# Patient Record
Sex: MALE | Race: WHITE | NOT HISPANIC OR LATINO | Employment: FULL TIME | URBAN - METROPOLITAN AREA
[De-identification: names, ages, dates, MRNs, and addresses within clinical notes are randomized per-mention and may not be internally consistent; named-entity substitution may affect disease eponyms.]

---

## 2022-12-12 ENCOUNTER — TELEPHONE (OUTPATIENT)
Dept: OPHTHALMOLOGY | Facility: CLINIC | Age: 22
End: 2022-12-12

## 2022-12-12 ENCOUNTER — HOSPITAL ENCOUNTER (EMERGENCY)
Facility: CLINIC | Age: 22
Discharge: HOME | End: 2022-12-12
Attending: EMERGENCY MEDICINE | Admitting: EMERGENCY MEDICINE
Payer: COMMERCIAL

## 2022-12-12 ENCOUNTER — OFFICE VISIT (OUTPATIENT)
Dept: OPHTHALMOLOGY | Facility: CLINIC | Age: 22
End: 2022-12-12
Attending: STUDENT IN AN ORGANIZED HEALTH CARE EDUCATION/TRAINING PROGRAM
Payer: COMMERCIAL

## 2022-12-12 VITALS
TEMPERATURE: 97.3 F | WEIGHT: 190 LBS | DIASTOLIC BLOOD PRESSURE: 63 MMHG | SYSTOLIC BLOOD PRESSURE: 122 MMHG | RESPIRATION RATE: 18 BRPM | HEART RATE: 69 BPM | OXYGEN SATURATION: 98 %

## 2022-12-12 DIAGNOSIS — H16.133 WELDERS' FLASH, BILATERAL: ICD-10-CM

## 2022-12-12 DIAGNOSIS — H16.133 UV KERATITIS, BILATERAL: Primary | ICD-10-CM

## 2022-12-12 PROCEDURE — 92004 COMPRE OPH EXAM NEW PT 1/>: CPT | Performed by: STUDENT IN AN ORGANIZED HEALTH CARE EDUCATION/TRAINING PROGRAM

## 2022-12-12 PROCEDURE — G0463 HOSPITAL OUTPT CLINIC VISIT: HCPCS

## 2022-12-12 PROCEDURE — 250N000009 HC RX 250: Performed by: EMERGENCY MEDICINE

## 2022-12-12 PROCEDURE — 99283 EMERGENCY DEPT VISIT LOW MDM: CPT

## 2022-12-12 RX ORDER — TETRACAINE HYDROCHLORIDE 5 MG/ML
SOLUTION OPHTHALMIC
Status: DISCONTINUED
Start: 2022-12-12 | End: 2022-12-12 | Stop reason: HOSPADM

## 2022-12-12 RX ORDER — ERYTHROMYCIN 5 MG/G
0.5 OINTMENT OPHTHALMIC 4 TIMES DAILY
Qty: 7 G | Refills: 0 | Status: SHIPPED | OUTPATIENT
Start: 2022-12-12 | End: 2022-12-19

## 2022-12-12 RX ORDER — TETRACAINE HYDROCHLORIDE 5 MG/ML
2 SOLUTION OPHTHALMIC ONCE
Status: COMPLETED | OUTPATIENT
Start: 2022-12-12 | End: 2022-12-12

## 2022-12-12 RX ADMIN — TETRACAINE HYDROCHLORIDE 2 DROP: 5 SOLUTION OPHTHALMIC at 01:12

## 2022-12-12 ASSESSMENT — VISUAL ACUITY
OD_SC: 20/50
OS_SC: 20/70
METHOD: SNELLEN - LINEAR
OS_PH_SC: 20/50
OD_PH_SC: 20/40

## 2022-12-12 ASSESSMENT — CONF VISUAL FIELD
OS_SUPERIOR_TEMPORAL_RESTRICTION: 0
OD_INFERIOR_TEMPORAL_RESTRICTION: 0
OS_INFERIOR_NASAL_RESTRICTION: 0
METHOD: COUNTING FINGERS
OS_INFERIOR_TEMPORAL_RESTRICTION: 0
OD_SUPERIOR_NASAL_RESTRICTION: 0
OS_NORMAL: 1
OD_NORMAL: 1
OD_INFERIOR_NASAL_RESTRICTION: 0
OS_SUPERIOR_NASAL_RESTRICTION: 0
OD_SUPERIOR_TEMPORAL_RESTRICTION: 0

## 2022-12-12 ASSESSMENT — TONOMETRY
OD_IOP_MMHG: 13
IOP_METHOD: TONOPEN
OS_IOP_MMHG: 14

## 2022-12-12 ASSESSMENT — EXTERNAL EXAM - LEFT EYE: OS_EXAM: WNL

## 2022-12-12 ASSESSMENT — ACTIVITIES OF DAILY LIVING (ADL): ADLS_ACUITY_SCORE: 35

## 2022-12-12 ASSESSMENT — EXTERNAL EXAM - RIGHT EYE: OD_EXAM: WNL

## 2022-12-12 ASSESSMENT — CUP TO DISC RATIO
OS_RATIO: 0.6
OD_RATIO: 0.55

## 2022-12-12 ASSESSMENT — ENCOUNTER SYMPTOMS: EYE PAIN: 1

## 2022-12-12 NOTE — ED PROVIDER NOTES
History   Chief Complaint:  Eye Pain      HPI   Rishabh Corrales is a 22 year old male who presents for evaluation of eye pain. On 12/11/22 around 1200 the patient was at work using a  very near to a welding torch while not wearing a welding mcintosh. During this time he was wearing a face shield and did not feel any injury to his eyes. Around 1600 he started to develop burning bilateral eye pain and blurred vision. He awoke from sleep shortly prior to arrival with significantly increased pain and worsened vision, prompting him to come into the ED for evaluation. He has not had any known injury to the eyes. He does not use contact lenses.     Review of Systems   Eyes: Positive for pain (bilateral) and visual disturbance (bilateral eyes).   All other systems reviewed and are negative.      Allergies:  No known drug allergies      Medications:  The patient is not currently taking any prescribed medications.      Past Medical History:     The patient denies any past medical history.     Social History:  The patient presents to the ED accompanied by his cousin.     Physical Exam     Patient Vitals for the past 24 hrs:   BP Temp Temp src Pulse Resp SpO2 Weight   12/12/22 0305 122/63 -- -- 69 -- 98 % --   12/12/22 0057 (!) 165/92 97.3  F (36.3  C) Temporal 83 18 100 % 86.2 kg (190 lb)       Physical Exam  Constitutional:  Oriented to person, place, and time. Minor distress due to pain.   HENT:   Head:    Normocephalic.   Mouth/Throat:   Oropharynx is clear and moist.   Eyes:    EOM are normal. Pupils are equal, round, and reactive to light. Eyes are significantly injected. No mattering or purulence. Vision can see two fingers, unable to visualize visual chart. No Fluorescein uptake. No foreign body. No dendritic lesion or abrasion. Intraocular pressure 9 on the right and 14 on the left. pH 7 bilaterally.   Neck:    Neck supple.   Musculoskeletal:  Normal range of motion.   Neurological:   Alert and oriented to  person, place, and time.           Moves all 4 extremities spontaneously    Skin:    No rash noted. No pallor.      Emergency Department Course     Emergency Department Course:     Reviewed:  I reviewed nursing notes, vitals and past medical history    Assessments:  0218:  I obtained history and examined the patient as noted above.     0300: I updated and reassessed the patient.     Consults:  0254: I spoke with Dr. Nowak of the ophthalmology service regarding patient's presentation, findings, and plan of care.     Interventions:  Fluorescein ophthalmic  0112 Tetracaine 2 drops ophthalmic    Disposition:  The patient was discharged to home.     Impression & Plan         Medical Decision Makin-year-old male that came in with decreased vision and eye pain after being near welding.  Differential includes foreign body, chemical burn, Welders flash/UV keratitis, or other causes.  Exam here does not show foreign body or fluorescein uptake.  He is in quite a bit of pain with significant eye injection bilaterally likely pointing towards UV keratitis.  I did discuss the case with ophthalmology but is willing to see him at 8 AM in the morning in clinic.  I do believe is otherwise appropriate to wait till that time.  He is told to return for worsening pain any new symptoms or concerns.     Diagnosis:    ICD-10-CM    1. Welders' flash, bilateral  H16.133            Scribe Disclosure:  I, Victorino Mansfield, am serving as a scribe at 1:36 AM on 2022 to document services personally performed by Trip Reyes MD based on my observations and the provider's statements to me.           Trip Reyes MD  [phone]

## 2022-12-12 NOTE — PROGRESS NOTES
HPI     Eye Pain Both Eyes      In both eyes.  Pain was noted as 4/10.  Occurring constantly.  It is worse throughout the day.  Since onset it is gradually improving (Symptoms slightly improving since he was at the ER.  ).  Treatments tried include eye drops (Had tetracaine in the ED which helped pain some ).  Response to treatment was mild improvement.           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  His eyes began hurting after he left work and was back at his hotel.  He took a nap and when he woke up he said he couldn't see anything at all.  He doesn't recall any objects getting into his eyes during the welding, but he did get dirt in both eyes earlier in the day from his hard hat, which he rubbed out right away and his eyes felt fine.     He was not doing the welding but was standing right behind the  - he states he was wearing a face shield but it doesn't have any UV protection.      TRISHA Herring 8:43 AM 12/12/2022              Last edited by Any Mcfadden on 12/12/2022  8:56 AM.          Review of systems for the eyes was negative other than the pertinent positives/negatives listed in the HPI.    Ocular Meds: none    Ocular Hx: none    FOHx: no family history of glaucoma or blindness    PMHx: No Diabetes/Autoimmune disease/Cancer/HTN/HUE    Assessment & Plan      Rishabh Corrales is a 22 year old male with the following diagnoses:    1. 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, noted significant eye discomfort in both eyes; eye exam grossly unremarkable except for ocular surface staining; no foreign body noted on lid eversion and fornices swept; no signs of infection    Patient denies contact lens use; Given history and exam findings, this appears to be consistent with UV keratitis OU; patient advised to start preservative free artificial tears QID and prn OU; Patient also to start erythromycin  ophthalmic ointment QID OU for 1 week and recommended to follow up with an ophthalmologist at this time; states he will follow up closer to his home as he lives several hours away; work excuse note provided; patient to wear protective eye wear    Also noted to have mildly increased c/d ratio; optic nerves appear healthy, IOP wnl, CVF full to CF; no family history of glaucoma; patient plans to follow up at a later date with a local eye care provider for baseline visual field testing and OCT RNFL; suspect physiologic cupping;    Counseled return precations    Patient disposition:   Return in about 1 week (around 12/19/2022) for Follow Up, VT, or sooner changes.      Attending Physician Attestation:  Complete documentation of historical and exam elements from today's encounter can be found in the full encounter summary report (not reduplicated in this progress note).  I personally obtained the chief complaint(s) and history of present illness.  I confirmed and edited as necessary the review of systems, past medical/surgical history, family history, social history, and examination findings as documented by others; and I examined the patient myself.  I personally reviewed the relevant tests, images, and reports as documented above.  I formulated and edited as necessary the assessment and plan and discussed the findings and management plan with the patient and family. . - Goldie Dugan MD

## 2022-12-12 NOTE — DISCHARGE INSTRUCTIONS
Please go to the Opthalmology Clinic at 8am 9th floor    [facility]. [address] [place] [id number]  9Th floor at 8am

## 2022-12-12 NOTE — TELEPHONE ENCOUNTER
Telephone Note    I was contacted by Dr. Reyes from [facility] regarding this patient. The following information was obtained via phone call with this provider.      Patient is a 22-year-old male who presents with bilateral eye pain after grinding medal. He had eyewear in place but not UV protective shield. He had burning and blurred vision which started a few hours after. Pain improves with tetracaine. Bilateral injection. No fluorescein uptake. No foreign body.     I conveyed to the consulting physician that I could provide some general thoughts regarding this patient but that a formal consult and evaluation would be necessary for me to provide an active role in the patient's care. Given the reported examination findings, I emphasized the importance of ophthalmology evaluation and I offered to see the patient in the ED today. As alternatives, I also offered the patient follow up with the eye clinic early next week, based on provider comfort. I conveyed to the provider that if there was any concern about the patient's care or my suggestions, the patient should be sent to the ED for evaluation right away.      Following communication with the patient, the provider and the patient selected to be seen in clinic.     Patient will come to PWB at 0730    Ellen Nowak MD  Ophthalmology, PGY-2

## 2022-12-12 NOTE — LETTER
12/12/2022       RE: Rishabh Corrales  [address]  [facility] [address]     Dear Colleague,    Thank you for referring your patient, Rishabh Corrales, to the [facility] [place] at [facility]. Please see a copy of my visit note below.    HPI     Eye Pain Both Eyes      In both eyes.  Pain was noted as 4/10.  Occurring constantly.  It is worse throughout the day.  Since onset it is gradually improving (Symptoms slightly improving since he was at the ER.  ).  Treatments tried include eye drops (Had tetracaine in the ED which helped pain some ).  Response to treatment was mild improvement.           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  His eyes began hurting after he left work and was back at his hotel.  He took a nap and when he woke up he said he couldn't see anything at all.  He doesn't recall any objects getting into his eyes during the welding, but he did get dirt in both eyes earlier in the day from his hard hat, which he rubbed out right away and his eyes felt fine.     He was not doing the welding but was standing right behind the  - he states he was wearing a face shield but it doesn't have any UV protection.      Any Mcfadden, TRISHA 8:43 AM 12/12/2022              Last edited by Any Mcfadden on 12/12/2022  8:56 AM.          Review of systems for the eyes was negative other than the pertinent positives/negatives listed in the HPI.    Ocular Meds: none    Ocular Hx: none    FOHx: no family history of glaucoma or blindness    PMHx: No Diabetes/Autoimmune disease/Cancer/HTN/HUE    Assessment & Plan     Rishabh Corrales is a 22 year old male with the following diagnoses:    1. 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, noted significant eye discomfort in both eyes; eye exam grossly unremarkable except for ocular surface staining; no foreign  body noted on lid eversion and fornices swept; no signs of infection    Patient denies contact lens use; Given history and exam findings, this appears to be consistent with UV keratitis OU; patient advised to start preservative free artificial tears QID and prn OU; Patient also to start erythromycin ophthalmic ointment QID OU for 1 week and recommended to follow up with an ophthalmologist at this time; states he will follow up closer to his home as he lives several hours away; work excuse note provided; patient to wear protective eye wear    Also noted to have mildly increased c/d ratio; optic nerves appear healthy, IOP wnl, CVF full to CF; no family history of glaucoma; patient plans to follow up at a later date with a local eye care provider for baseline visual field testing and OCT RNFL; suspect physiologic cupping;    Counseled return precations    Patient disposition:   Return in about 1 week (around 12/19/2022) for Follow Up, VT, or sooner changes.      Attending Physician Attestation:  Complete documentation of historical and exam elements from today's encounter can be found in the full encounter summary report (not reduplicated in this progress note).  I personally obtained the chief complaint(s) and history of present illness.  I confirmed and edited as necessary the review of systems, past medical/surgical history, family history, social history, and examination findings as documented by others; and I examined the patient myself.  I personally reviewed the relevant tests, images, and reports as documented above.  I formulated and edited as necessary the assessment and plan and discussed the findings and management plan with the patient and family. . - Goldie Dugan MD        Again, thank you for allowing me to participate in the care of your patient.      Sincerely,    Goldie Dugan MD

## 2022-12-12 NOTE — LETTER
December 12, 2022      Re: Rishabh Corrales   2000    To Whom It May Concern:    This is to confirm that the above patient was seen on 12/12/2022.  Rishabh Corrales is unable to return to work today. Please excuse Rishabh Corrales from work until 12/15/2022. He may return to work sooner if he feels better.    Thank you for your cooperation in this matter.  Please do not hesitate to contact me if you have any further questions.    Sincerely,      Goldie Dugan MD

## 2022-12-12 NOTE — LETTER
December 12, 2022      To Whom It May Concern:      Rishabh Corrales was seen in our Emergency Department today, 12/12/22.  I expect his condition to improve over the next 2-4 days.  He may return to work/school when improved.    Sincerely,      Marcelina Castro RN

## 2022-12-12 NOTE — PATIENT INSTRUCTIONS
Use preservative free artificial tears one drop four times a day and as needed for comfort to both eyes; some brands include: refresh, systane, thera tears, blink, etc    Use erythromycin ophthalmic ointment, one small ribbon four times a day to both eyes and as needed for comfort    DO NOT use eye drops that say 'take the red out' including Visine or Clear Eyes

## 2022-12-12 NOTE — ED TRIAGE NOTES
"Presents to triage with bilateral loss of vision and eye pain. Patient stated earlier today he was working as a  and did not have any eye pain or known injury to eyes. He began to have blurry vision this evening and was awakened from sleep with bilateral eye pain and loss of vision. Patient unsure if vision is completely gone at this time because it hurts too much to open eyes. Concerned he may have \"'s flash\"     Triage Assessment     Row Name 12/12/22 0059       Triage Assessment (Adult)    Airway WDL WDL       Respiratory WDL    Respiratory WDL WDL       Cardiac WDL    Cardiac WDL WDL              "

## (undated) RX ORDER — TETRACAINE HYDROCHLORIDE 5 MG/ML
SOLUTION OPHTHALMIC
Status: DISPENSED
Start: 2022-12-12